# Patient Record
Sex: MALE | URBAN - NONMETROPOLITAN AREA
[De-identification: names, ages, dates, MRNs, and addresses within clinical notes are randomized per-mention and may not be internally consistent; named-entity substitution may affect disease eponyms.]

---

## 2021-06-24 LAB
ALBUMIN SERPL-MCNC: 4.3 G/DL (ref 3.4–5)
ALBUMIN/GLOB SERPL: 1.5 {RATIO}
ALP SERPL-CCNC: 78 U/L (ref 50–144)
ALT SERPL-CCNC: 22 U/L (ref 1–45)
ANION GAP SERPL CALC-SCNC: 8 MMOL/L (ref 7–16)
AST SERPL-CCNC: 23 U/L (ref 17–59)
BASOPHILS # BLD AUTO: 0.05 X10(3)/MCL (ref 0.01–0.08)
BASOPHILS NFR BLD AUTO: 0.7 % (ref 0.1–1.2)
BILIRUB SERPL-MCNC: 0.31 MG/DL (ref 0.1–1)
BUN SERPL-MCNC: 11 MG/DL (ref 7–20)
CALCIUM SERPL-MCNC: 9.7 MG/DL (ref 8.4–10.2)
CHLORIDE SERPL-SCNC: 103 MMOL/L (ref 94–110)
CHOLEST SERPL-MCNC: 178 MG/DL (ref 0–200)
CO2 SERPL-SCNC: 29 MMOL/L (ref 21–32)
CREAT SERPL-MCNC: 0.9 MG/DL (ref 0.66–1.25)
CREAT/UREA NIT SERPL: 12.2 (ref 12–20)
EOSINOPHIL # BLD AUTO: 0.35 X10(3)/MCL (ref 0.04–0.54)
EOSINOPHIL NFR BLD AUTO: 4.9 % (ref 0.7–7)
ERYTHROCYTE [DISTWIDTH] IN BLOOD BY AUTOMATED COUNT: 12.5 % (ref 11.6–14.4)
GLOBULIN SER-MCNC: 2.8 G/DL (ref 2–3.9)
GLUCOSE SERPL-MCNC: 100 MGM./DL (ref 70–115)
HCT VFR BLD AUTO: 44.1 % (ref 36–52)
HDLC SERPL-MCNC: 38 MG/DL (ref 40–60)
HGB BLD-MCNC: 15.1 G/DL (ref 13–18)
IMM GRANULOCYTES # BLD AUTO: 0.02 X10E3/UL (ref 0–0.03)
IMM GRANULOCYTES NFR BLD AUTO: 0.3 % (ref 0–0.5)
LDLC SERPL CALC-MCNC: 122.1 MG/DL (ref 30–100)
LYMPHOCYTES # BLD AUTO: 2.36 X10(3)/MCL (ref 1.32–3.57)
LYMPHOCYTES NFR BLD AUTO: 32.8 % (ref 20–55)
MCH RBC QN AUTO: 29.3 PG (ref 27–34)
MCHC RBC AUTO-ENTMCNC: 34.2 G/DL (ref 31–37)
MCV RBC AUTO: 85.5 FL (ref 79–99)
MONOCYTES # BLD AUTO: 0.85 X10(3)/MCL (ref 0.3–0.82)
MONOCYTES NFR BLD AUTO: 11.8 % (ref 4.7–12.5)
NEUTROPHILS # BLD AUTO: 3.56 X10(3)/MCL (ref 1.78–5.38)
NEUTROPHILS NFR BLD AUTO: 49.5 % (ref 37–73)
PLATELET # BLD AUTO: 288 X10(3)/MCL (ref 140–371)
PMV BLD AUTO: 10.9 FL (ref 9.4–12.4)
POTASSIUM SERPL-SCNC: 4.4 MMOL/L (ref 3.5–5.1)
PROT SERPL-MCNC: 7.1 G/DL (ref 6.3–8.2)
RBC # BLD AUTO: 5.16 X10(6)/MCL (ref 4–6)
SODIUM SERPL-SCNC: 140 MMOL/L (ref 135–145)
T4 FREE SERPL-MCNC: 1.03 NG/DL (ref 0.78–2.19)
TRIGL SERPL-MCNC: 97 MG/DL (ref 30–200)
TSH SERPL-ACNC: 6.27 UIU/ML (ref 0.36–3.74)
WBC # SPEC AUTO: 7.2 X10(3)/MCL (ref 4–11.5)

## 2022-02-24 ENCOUNTER — HISTORICAL (OUTPATIENT)
Dept: ADMINISTRATIVE | Facility: HOSPITAL | Age: 27
End: 2022-02-24

## 2022-04-11 ENCOUNTER — HISTORICAL (OUTPATIENT)
Dept: ADMINISTRATIVE | Facility: HOSPITAL | Age: 27
End: 2022-04-11

## 2022-04-29 VITALS
SYSTOLIC BLOOD PRESSURE: 118 MMHG | BODY MASS INDEX: 28.89 KG/M2 | WEIGHT: 184.06 LBS | HEIGHT: 67 IN | DIASTOLIC BLOOD PRESSURE: 78 MMHG | OXYGEN SATURATION: 97 %

## 2022-05-08 ENCOUNTER — HISTORICAL (OUTPATIENT)
Dept: ADMINISTRATIVE | Facility: HOSPITAL | Age: 27
End: 2022-05-08

## 2022-06-28 ENCOUNTER — HOSPITAL ENCOUNTER (EMERGENCY)
Facility: HOSPITAL | Age: 27
Discharge: PSYCHIATRIC OR SUBSTANCE USE FACILITY | End: 2022-06-29
Attending: STUDENT IN AN ORGANIZED HEALTH CARE EDUCATION/TRAINING PROGRAM
Payer: MEDICARE

## 2022-06-28 DIAGNOSIS — F32.A DEPRESSION, UNSPECIFIED DEPRESSION TYPE: Primary | ICD-10-CM

## 2022-06-28 DIAGNOSIS — R45.89 SUICIDAL RISK: ICD-10-CM

## 2022-06-28 DIAGNOSIS — R45.851 SUICIDAL IDEATIONS: ICD-10-CM

## 2022-06-28 DIAGNOSIS — E03.9 HYPOTHYROIDISM, UNSPECIFIED TYPE: ICD-10-CM

## 2022-06-28 LAB
ALBUMIN SERPL-MCNC: 4.3 GM/DL (ref 3.5–5)
ALBUMIN/GLOB SERPL: 1.2 RATIO (ref 1.1–2)
ALP SERPL-CCNC: 88 UNIT/L (ref 40–150)
ALT SERPL-CCNC: 17 UNIT/L (ref 0–55)
AMPHET UR QL SCN: NEGATIVE
APAP SERPL-MCNC: <17.4 UG/ML (ref 17.4–30)
APPEARANCE UR: CLEAR
AST SERPL-CCNC: 18 UNIT/L (ref 5–34)
BARBITURATE SCN PRESENT UR: NEGATIVE
BASOPHILS # BLD AUTO: 0.04 X10(3)/MCL (ref 0–0.2)
BASOPHILS NFR BLD AUTO: 0.5 %
BENZODIAZ UR QL SCN: NEGATIVE
BILIRUB UR QL STRIP.AUTO: NEGATIVE MG/DL
BILIRUBIN DIRECT+TOT PNL SERPL-MCNC: 0.4 MG/DL
BUN SERPL-MCNC: 12 MG/DL (ref 8.9–20.6)
CALCIUM SERPL-MCNC: 9.9 MG/DL (ref 8.4–10.2)
CANNABINOIDS UR QL SCN: NEGATIVE
CHLORIDE SERPL-SCNC: 104 MMOL/L (ref 98–107)
CO2 SERPL-SCNC: 25 MMOL/L (ref 22–29)
COCAINE UR QL SCN: NEGATIVE
COLOR UR AUTO: YELLOW
CREAT SERPL-MCNC: 0.97 MG/DL (ref 0.73–1.18)
EOSINOPHIL # BLD AUTO: 0.13 X10(3)/MCL (ref 0–0.9)
EOSINOPHIL NFR BLD AUTO: 1.5 %
ERYTHROCYTE [DISTWIDTH] IN BLOOD BY AUTOMATED COUNT: 12.4 % (ref 11.5–17)
ETHANOL SERPL-MCNC: <10 MG/DL
FENTANYL UR QL SCN: NEGATIVE
GLOBULIN SER-MCNC: 3.5 GM/DL (ref 2.4–3.5)
GLUCOSE SERPL-MCNC: 100 MG/DL (ref 74–100)
GLUCOSE UR QL STRIP.AUTO: NEGATIVE MG/DL
HCT VFR BLD AUTO: 44.9 % (ref 42–52)
HGB BLD-MCNC: 15 GM/DL (ref 14–18)
IMM GRANULOCYTES # BLD AUTO: 0.06 X10(3)/MCL (ref 0–0.02)
IMM GRANULOCYTES NFR BLD AUTO: 0.7 % (ref 0–0.43)
KETONES UR QL STRIP.AUTO: NEGATIVE MG/DL
LEUKOCYTE ESTERASE UR QL STRIP.AUTO: NEGATIVE UNIT/L
LYMPHOCYTES # BLD AUTO: 1.87 X10(3)/MCL (ref 0.6–4.6)
LYMPHOCYTES NFR BLD AUTO: 21.3 %
MCH RBC QN AUTO: 28.5 PG (ref 27–31)
MCHC RBC AUTO-ENTMCNC: 33.4 MG/DL (ref 33–36)
MCV RBC AUTO: 85.2 FL (ref 80–94)
MDMA UR QL SCN: NEGATIVE
MONOCYTES # BLD AUTO: 0.83 X10(3)/MCL (ref 0.1–1.3)
MONOCYTES NFR BLD AUTO: 9.4 %
NEUTROPHILS # BLD AUTO: 5.9 X10(3)/MCL (ref 2.1–9.2)
NEUTROPHILS NFR BLD AUTO: 66.6 %
NITRITE UR QL STRIP.AUTO: NEGATIVE
OPIATES UR QL SCN: NEGATIVE
PCP UR QL: NEGATIVE
PH UR STRIP.AUTO: 5.5 [PH]
PH UR: 5.5 [PH] (ref 3–11)
PLATELET # BLD AUTO: 287 X10(3)/MCL (ref 130–400)
PMV BLD AUTO: 10 FL (ref 9.4–12.4)
POTASSIUM SERPL-SCNC: 3.7 MMOL/L (ref 3.5–5.1)
PROT SERPL-MCNC: 7.8 GM/DL (ref 6.4–8.3)
PROT UR QL STRIP.AUTO: NEGATIVE MG/DL
RBC # BLD AUTO: 5.27 X10(6)/MCL (ref 4.7–6.1)
RBC UR QL AUTO: NEGATIVE UNIT/L
SALICYLATES SERPL-MCNC: <5 MG/DL
SARS-COV-2 RDRP RESP QL NAA+PROBE: NEGATIVE
SODIUM SERPL-SCNC: 140 MMOL/L (ref 136–145)
SP GR UR STRIP.AUTO: 1.02
SPECIFIC GRAVITY, URINE AUTO (.000) (OHS): 1.02 (ref 1–1.03)
TSH SERPL-ACNC: 6.47 UIU/ML (ref 0.35–4.94)
UROBILINOGEN UR STRIP-ACNC: 0.2 MG/DL
WBC # SPEC AUTO: 8.8 X10(3)/MCL (ref 4.5–11.5)

## 2022-06-28 PROCEDURE — 87635 SARS-COV-2 COVID-19 AMP PRB: CPT | Performed by: STUDENT IN AN ORGANIZED HEALTH CARE EDUCATION/TRAINING PROGRAM

## 2022-06-28 PROCEDURE — 80053 COMPREHEN METABOLIC PANEL: CPT | Performed by: STUDENT IN AN ORGANIZED HEALTH CARE EDUCATION/TRAINING PROGRAM

## 2022-06-28 PROCEDURE — 80179 DRUG ASSAY SALICYLATE: CPT | Performed by: STUDENT IN AN ORGANIZED HEALTH CARE EDUCATION/TRAINING PROGRAM

## 2022-06-28 PROCEDURE — 80143 DRUG ASSAY ACETAMINOPHEN: CPT | Performed by: STUDENT IN AN ORGANIZED HEALTH CARE EDUCATION/TRAINING PROGRAM

## 2022-06-28 PROCEDURE — 82077 ASSAY SPEC XCP UR&BREATH IA: CPT | Performed by: STUDENT IN AN ORGANIZED HEALTH CARE EDUCATION/TRAINING PROGRAM

## 2022-06-28 PROCEDURE — 36415 COLL VENOUS BLD VENIPUNCTURE: CPT | Performed by: STUDENT IN AN ORGANIZED HEALTH CARE EDUCATION/TRAINING PROGRAM

## 2022-06-28 PROCEDURE — 84443 ASSAY THYROID STIM HORMONE: CPT | Performed by: STUDENT IN AN ORGANIZED HEALTH CARE EDUCATION/TRAINING PROGRAM

## 2022-06-28 PROCEDURE — 85025 COMPLETE CBC W/AUTO DIFF WBC: CPT | Performed by: STUDENT IN AN ORGANIZED HEALTH CARE EDUCATION/TRAINING PROGRAM

## 2022-06-28 PROCEDURE — 80307 DRUG TEST PRSMV CHEM ANLYZR: CPT | Performed by: STUDENT IN AN ORGANIZED HEALTH CARE EDUCATION/TRAINING PROGRAM

## 2022-06-28 PROCEDURE — 99285 EMERGENCY DEPT VISIT HI MDM: CPT | Mod: 25

## 2022-06-28 PROCEDURE — 81003 URINALYSIS AUTO W/O SCOPE: CPT | Performed by: STUDENT IN AN ORGANIZED HEALTH CARE EDUCATION/TRAINING PROGRAM

## 2022-06-28 RX ORDER — LEVOTHYROXINE SODIUM 125 UG/1
125 TABLET ORAL
Qty: 30 TABLET | Refills: 11 | Status: ON HOLD | OUTPATIENT
Start: 2022-06-28 | End: 2022-06-29

## 2022-06-29 ENCOUNTER — HOSPITAL ENCOUNTER (INPATIENT)
Facility: HOSPITAL | Age: 27
LOS: 6 days | Discharge: HOME | DRG: 885 | End: 2022-07-05
Attending: PSYCHIATRY & NEUROLOGY | Admitting: PSYCHIATRY & NEUROLOGY
Payer: MEDICARE

## 2022-06-29 VITALS
DIASTOLIC BLOOD PRESSURE: 90 MMHG | RESPIRATION RATE: 18 BRPM | BODY MASS INDEX: 27 KG/M2 | SYSTOLIC BLOOD PRESSURE: 139 MMHG | OXYGEN SATURATION: 99 % | TEMPERATURE: 98 F | WEIGHT: 170 LBS | HEART RATE: 86 BPM

## 2022-06-29 DIAGNOSIS — F32.9 MDD (MAJOR DEPRESSIVE DISORDER): ICD-10-CM

## 2022-06-29 PROBLEM — F33.2 MAJOR DEPRESSIVE DISORDER, RECURRENT EPISODE, SEVERE: Status: ACTIVE | Noted: 2022-06-29

## 2022-06-29 PROBLEM — R15.9 ENCOPRESIS: Status: ACTIVE | Noted: 2022-06-29

## 2022-06-29 PROBLEM — E03.9 HYPOTHYROID: Status: ACTIVE | Noted: 2022-06-29

## 2022-06-29 PROCEDURE — 25000003 PHARM REV CODE 250: Performed by: PSYCHIATRY & NEUROLOGY

## 2022-06-29 PROCEDURE — 11400000 HC PSYCH PRIVATE ROOM

## 2022-06-29 RX ORDER — FLUOXETINE 10 MG/1
10 CAPSULE ORAL DAILY
Status: DISCONTINUED | OUTPATIENT
Start: 2022-06-29 | End: 2022-07-05 | Stop reason: HOSPADM

## 2022-06-29 RX ORDER — ACETAMINOPHEN 325 MG/1
650 TABLET ORAL EVERY 6 HOURS PRN
Status: DISCONTINUED | OUTPATIENT
Start: 2022-06-29 | End: 2022-07-05 | Stop reason: HOSPADM

## 2022-06-29 RX ORDER — ADHESIVE BANDAGE
30 BANDAGE TOPICAL DAILY PRN
Status: DISCONTINUED | OUTPATIENT
Start: 2022-06-29 | End: 2022-07-05 | Stop reason: HOSPADM

## 2022-06-29 RX ORDER — MAG HYDROX/ALUMINUM HYD/SIMETH 200-200-20
30 SUSPENSION, ORAL (FINAL DOSE FORM) ORAL EVERY 6 HOURS PRN
Status: DISCONTINUED | OUTPATIENT
Start: 2022-06-29 | End: 2022-07-05 | Stop reason: HOSPADM

## 2022-06-29 RX ORDER — IBUPROFEN 400 MG/1
400 TABLET ORAL EVERY 6 HOURS PRN
Status: DISCONTINUED | OUTPATIENT
Start: 2022-06-29 | End: 2022-07-05 | Stop reason: HOSPADM

## 2022-06-29 RX ADMIN — LEVOTHYROXINE SODIUM 125 MCG: 0.1 TABLET ORAL at 05:06

## 2022-06-29 RX ADMIN — FLUOXETINE 10 MG: 10 CAPSULE ORAL at 02:06

## 2022-06-29 NOTE — HOSPITAL COURSE
He was admitted to the [facility] and monitored for safety. He was provided with therapy. Meds were reconciled. He was restarted on synthroid. He was given prozac for depression. Mood improved. HE denied si/hi. He learned coping skills. He was worried about being home to pay bills. He has no aggression and no psychosis. He was stable for discharge.    Psychiatric Mental Status Exam:  General Appearance: well groomed  Arousal: alert  Behavior: cooperative  Movements and Motor Activity: no abnormal involuntary movements noted  Orientation: oriented to person, place, time, and situation  Speech: normal rate, normal rhythm, normal volume, normal tone  Mood: good  Affect: mood-congruent, constricted  Thought Process: linear, logical  Associations: intact  Thought Content and Perceptions: no suicidal ideation, no homicidal ideation, no auditory hallucinations, no visual hallucinations, no paranoid ideation, no ideas of reference, no evidence of delusions or psychosis  Recent and Remote Memory: recent memory intact, remote memory intact  Attention and Concentration: intact, attentive to conversation  Fund of Knowledge: intact, aware of current events, vocabulary appropriate  Insight: fair  Judgment: fair

## 2022-06-29 NOTE — NURSING
Awake and alert c/o voiced of rt face pain scale 7/10 Tylenol [id number] po given will cont to monitor

## 2022-06-29 NOTE — SUBJECTIVE & OBJECTIVE
Patient was seen via video telemedicine and consented to the interview. The patient was located in [place], [place] and the provider was located in [place], [place].    Psychiatric Mental Status Exam:  General Appearance: disheveled  Arousal: alert  Behavior: cooperative  Movements and Motor Activity: no abnormal involuntary movements noted  Orientation: oriented to person, place, time, and situation  Speech: normal rate, normal rhythm, normal volume, normal tone  Mood: Depressed  Affect: mood-congruent, constricted  Thought Process: linear, logical  Associations: intact  Thought Content and Perceptions: (+)suicidal ideation, no homicidal ideation, no auditory hallucinations, no visual hallucinations, no paranoid ideation, no ideas of reference, no evidence of delusions or psychosis  Recent and Remote Memory: recent memory intact, remote memory intact  Attention and Concentration: intact, attentive to conversation  Fund of Knowledge: intact, aware of current events, vocabulary appropriate  Insight: limited  Judgment: limited          Patient History               Medical as of 6/29/2022       Past Medical History       Diagnosis Date Comments Source    Depression -- -- Provider    Psychiatric problem -- -- Provider    Suicide attempt -- -- Provider    Thyroid disease -- -- Provider                          Surgical as of 6/29/2022    Past Surgical History: Patient provided no pertinent surgical history.               Family as of 6/29/2022    None               Tobacco Use as of 6/29/2022       Smoking Status Smoking Start Date Smoking Quit Date Packs/Day Years Used    Never Smoker -- -- -- --      Types Comments Smokeless Tobacco Status Smokeless Tobacco Quit Date Source    -- -- Never Used -- Provider                  Alcohol Use as of 6/29/2022       Alcohol Use Drinks/Week Alcohol/Week Comments Source    Never   -- -- Provider                  Drug Use as of 6/29/2022       Drug Use Types Frequency Comments Source     Never -- -- -- Provider                  Sexual Activity as of 6/29/2022    None               Activities of Daily Living as of 6/29/2022    None               Social Documentation as of 6/29/2022    None               Occupational as of 6/29/2022    None               Socioeconomic as of 6/29/2022       Marital Status Spouse Name Number of Children Years Education Education Level Preferred Language Ethnicity Race Source    Significant Other -- 0 -- -- -- -- -- Provider                  Pertinent History       Question Response Comments    Lives with significant other --    Place in Birth Order -- --    Lives in home --    Number of Siblings -- --    Raised by -- --    Legal Involvement -- --    Childhood Trauma -- --    Criminal History of none --    Financial Status unemployed --    Highest Level of Education unfinished college --    Does patient have access to a firearm? No --     Service No --    Primary Leisure Activity -- --    Spirituality -- --          Past Medical History:   Diagnosis Date    Depression     Psychiatric problem     Suicide attempt     Thyroid disease      No past surgical history on file.  Family History    None       Tobacco Use    Smoking status: Never Smoker    Smokeless tobacco: Never Used   Substance and Sexual Activity    Alcohol use: Never    Drug use: Never    Sexual activity: Not on file     Review of patient's allergies indicates:   Allergen Reactions    Fish containing products Anaphylaxis    Shellfish containing products Anaphylaxis       No current facility-administered medications on file prior to encounter.     Current Outpatient Medications on File Prior to Encounter   Medication Sig    levothyroxine (SYNTHROID) 125 MCG tablet Take 1 tablet (125 mcg total) by mouth before breakfast.     Psychotherapeutics (From admission, onward)                None          Review of Systems  Strengths and Liabilities: Strength: Patient is expressive/articulate., Liability: Patient  "is defensive., Liability: Patient has poor judgment    Objective:     Vital Signs (Most Recent):  Temp: 98.1 °F (36.7 °C) (06/29/22 0645)  Pulse: 73 (06/29/22 0645)  Resp: 18 (06/29/22 0645)  BP: (!) 145/70 (06/29/22 0645)  SpO2: 99 % (06/29/22 0645)   Vital Signs (24h Range):  Temp:  [97.5 °F (36.4 °C)-98.1 °F (36.7 °C)] 98.1 °F (36.7 °C)  Pulse:  [73-89] 73  Resp:  [16-18] 18  SpO2:  [98 %-99 %] 99 %  BP: (136-145)/(70-95) 145/70     Height: 5' 10" (177.8 cm)  Weight: 81.4 kg (179 lb 7.3 oz)  Body mass index is 25.75 kg/m².    No intake or output data in the 24 hours ending 06/29/22 1336    Physical Exam     Significant Labs: Last 72 Hours:   Recent Lab Results         06/28/22 2107 06/28/22  2100        Phencyclidine Negative         Albumin/Globulin Ratio   1.2       Acetaminophen (Tylenol), Serum   <17.4       Albumin   4.3       Alcohol, Serum   <10.0       Alkaline Phosphatase   88       ALT   17       Amphetamine Screen, Ur Negative         Appearance, UA Clear         AST   18       Barbiturate Screen, Ur Negative         Baso #   0.04       Basophil %   0.5       Benzodiazepine Screen, Urine Negative         BILIRUBIN TOTAL   0.4       Bilirubin, UA Negative         BUN   12.0       Calcium   9.9       Cannabinoids, Urine Negative         Chloride   104       CO2   25       Cocaine (Metab.) Negative         Color, UA Yellow         ID NOW COVID-19, (TANVI) Negative         Creatinine   0.97       eGFR if non    >60       Eos #   0.13       Eosinophil %   1.5       Fentanyl, Urine Negative         Globulin, Total   3.5       Glucose   100       Glucose, UA Negative         Hematocrit   44.9       Hemoglobin   15.0       Immature Grans (Abs)   0.06       Immature Granulocytes   0.7       Ketones, UA Negative         Leukocytes, UA Negative         Lymph #   1.87       LYMPH %   21.3       MCH   28.5       MCHC   33.4       MCV   85.2       MDMA, Urine Negative         Mono #   0.83       " Mono %   9.4       MPV   10.0       Neut #   5.9       Neut %   66.6       NITRITE UA Negative         Occult Blood UA Negative         Opiate Scrn, Ur Negative         pH, UA 5.5         pH, Urine 5.5         Platelets   287       Potassium   3.7       PROTEIN TOTAL   7.8       Protein, UA Negative         RBC   5.27       RDW   12.4       Salicylate Lvl   <5.0       Sodium   140       Specific Gravity,UA 1.025         Specific Gravity, Urine Auto 1.025         Thyroid Stimulating Hormone   6.4657       Urobilinogen, UA 0.2         WBC   8.8               Significant Imaging: I have reviewed all pertinent imaging results/findings within the past 24 hours.

## 2022-06-29 NOTE — PROGRESS NOTES
"[facility]  Psychiatry  Progress Note    Patient Name: Erasmo Man  MRN: [id number]   Code Status: Full Code  Admission Date: 6/29/2022  Hospital Length of Stay: 0 days  Expected Discharge Date: 7/6/2022  Attending Physician: Edmundo Jamison MD  Primary Care Provider: Ignacio Shetty NP    Current Legal Status: Physician's Emergency Certificate (PEC)    Patient information was obtained from patient and ER records.       Subjective:     Patient is a 26 y.o., male, presents with:    Principal Problem:<principal problem not specified>    Chief Complaint: A stupid mistake.     HPI: "A stupid mistake saying I was going to kill myself."  26-year-old male admitted for depression and SI with plan to kills self with a knife. He argued with his girlfriend and reportedly grabbed a knife to harm self and she called the police. Admits he did this but says he was not going to go through with harming self.He is upset because of financial strain and not working. 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 He  does have a history of depression but was never treated. He tried to harm self as teenager but did not go to the hospital. He has no psychotic features and no shadi. 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      Hospital Course: He was admitted to the [facility] hospital and monitored for safety. He was provided with therapy. Meds were reconciled. He was restarted on synthroid. He was given prozac for depression.       Patient was seen via video telemedicine and consented to the interview. The patient was located in [place], [place] and the provider was located in [place], [place].    Psychiatric Mental Status Exam:  General Appearance: disheveled  Arousal: alert  Behavior: cooperative  Movements and Motor Activity: no abnormal involuntary movements noted  Orientation: oriented to person, place, time, and situation  Speech: normal rate, normal " rhythm, normal volume, normal tone  Mood: Depressed  Affect: mood-congruent, constricted  Thought Process: linear, logical  Associations: intact  Thought Content and Perceptions: (+)suicidal ideation, no homicidal ideation, no auditory hallucinations, no visual hallucinations, no paranoid ideation, no ideas of reference, no evidence of delusions or psychosis  Recent and Remote Memory: recent memory intact, remote memory intact  Attention and Concentration: intact, attentive to conversation  Fund of Knowledge: intact, aware of current events, vocabulary appropriate  Insight: limited  Judgment: limited          Patient History               Medical as of 6/29/2022       Past Medical History       Diagnosis Date Comments Source    Depression -- -- Provider    Psychiatric problem -- -- Provider    Suicide attempt -- -- Provider    Thyroid disease -- -- Provider                          Surgical as of 6/29/2022    Past Surgical History: Patient provided no pertinent surgical history.               Family as of 6/29/2022    None               Tobacco Use as of 6/29/2022       Smoking Status Smoking Start Date Smoking Quit Date Packs/Day Years Used    Never Smoker -- -- -- --      Types Comments Smokeless Tobacco Status Smokeless Tobacco Quit Date Source    -- -- Never Used -- Provider                  Alcohol Use as of 6/29/2022       Alcohol Use Drinks/Week Alcohol/Week Comments Source    Never   -- -- Provider                  Drug Use as of 6/29/2022       Drug Use Types Frequency Comments Source    Never -- -- -- Provider                  Sexual Activity as of 6/29/2022    None               Activities of Daily Living as of 6/29/2022    None               Social Documentation as of 6/29/2022    None               Occupational as of 6/29/2022    None               Socioeconomic as of 6/29/2022       Marital Status Spouse Name Number of Children Years Education Education Level Preferred Language Ethnicity Race Source     Significant Other -- 0 -- -- -- -- -- Provider                  Pertinent History       Question Response Comments    Lives with significant other --    Place in Birth Order -- --    Lives in home --    Number of Siblings -- --    Raised by -- --    Legal Involvement -- --    Childhood Trauma -- --    Criminal History of none --    Financial Status unemployed --    Highest Level of Education unfinished college --    Does patient have access to a firearm? No --     Service No --    Primary Leisure Activity -- --    Spirituality -- --          Past Medical History:   Diagnosis Date    Depression     Psychiatric problem     Suicide attempt     Thyroid disease      No past surgical history on file.  Family History    None       Tobacco Use    Smoking status: Never Smoker    Smokeless tobacco: Never Used   Substance and Sexual Activity    Alcohol use: Never    Drug use: Never    Sexual activity: Not on file     Review of patient's allergies indicates:   Allergen Reactions    Fish containing products Anaphylaxis    Shellfish containing products Anaphylaxis       No current facility-administered medications on file prior to encounter.     Current Outpatient Medications on File Prior to Encounter   Medication Sig    levothyroxine (SYNTHROID) 125 MCG tablet Take 1 tablet (125 mcg total) by mouth before breakfast.     Psychotherapeutics (From admission, onward)                None          Review of Systems  Strengths and Liabilities: Strength: Patient is expressive/articulate., Liability: Patient is defensive., Liability: Patient has poor judgment    Objective:     Vital Signs (Most Recent):  Temp: 98.1 °F (36.7 °C) (06/29/22 0645)  Pulse: 73 (06/29/22 0645)  Resp: 18 (06/29/22 0645)  BP: (!) 145/70 (06/29/22 0645)  SpO2: 99 % (06/29/22 0645)   Vital Signs (24h Range):  Temp:  [97.5 °F (36.4 °C)-98.1 °F (36.7 °C)] 98.1 °F (36.7 °C)  Pulse:  [73-89] 73  Resp:  [16-18] 18  SpO2:  [98 %-99 %] 99 %  BP:  "(136-145)/(70-95) 145/70     Height: 5' 10" (177.8 cm)  Weight: 81.4 kg (179 lb 7.3 oz)  Body mass index is 25.75 kg/m².    No intake or output data in the 24 hours ending 06/29/22 1336    Physical Exam     Significant Labs: Last 72 Hours:   Recent Lab Results         06/28/22  2107   06/28/22  2100        Phencyclidine Negative         Albumin/Globulin Ratio   1.2       Acetaminophen (Tylenol), Serum   <17.4       Albumin   4.3       Alcohol, Serum   <10.0       Alkaline Phosphatase   88       ALT   17       Amphetamine Screen, Ur Negative         Appearance, UA Clear         AST   18       Barbiturate Screen, Ur Negative         Baso #   0.04       Basophil %   0.5       Benzodiazepine Screen, Urine Negative         BILIRUBIN TOTAL   0.4       Bilirubin, UA Negative         BUN   12.0       Calcium   9.9       Cannabinoids, Urine Negative         Chloride   104       CO2   25       Cocaine (Metab.) Negative         Color, UA Yellow         ID NOW COVID-19, (TANVI) Negative         Creatinine   0.97       eGFR if non    >60       Eos #   0.13       Eosinophil %   1.5       Fentanyl, Urine Negative         Globulin, Total   3.5       Glucose   100       Glucose, UA Negative         Hematocrit   44.9       Hemoglobin   15.0       Immature Grans (Abs)   0.06       Immature Granulocytes   0.7       Ketones, UA Negative         Leukocytes, UA Negative         Lymph #   1.87       LYMPH %   21.3       MCH   28.5       MCHC   33.4       MCV   85.2       MDMA, Urine Negative         Mono #   0.83       Mono %   9.4       MPV   10.0       Neut #   5.9       Neut %   66.6       NITRITE UA Negative         Occult Blood UA Negative         Opiate Scrn, Ur Negative         pH, UA 5.5         pH, Urine 5.5         Platelets   287       Potassium   3.7       PROTEIN TOTAL   7.8       Protein, UA Negative         RBC   5.27       RDW   12.4       Salicylate Lvl   <5.0       Sodium   140       Specific Gravity,UA " 1.025         Specific Gravity, Urine Auto 1.025         Thyroid Stimulating Hormone   6.4657       Urobilinogen, UA 0.2         WBC   8.8               Significant Imaging: I have reviewed all pertinent imaging results/findings within the past 24 hours.       Scheduled Medications:   FLUoxetine  10 mg Oral Daily    levothyroxine  125 mcg Oral Before breakfast       PRN Medications:  acetaminophen, aluminum-magnesium hydroxide-simethicone, ibuprofen, magnesium hydroxide 400 mg/5 ml    Review of patient's allergies indicates:   Allergen Reactions    Fish containing products Anaphylaxis    Shellfish containing products Anaphylaxis       Assessment/Plan:     Major depressive disorder, recurrent episode, severe  Start prozac for depression and reviewed side effets         Need for Continued Hospitalization:  Psychiatric illness continues to pose a potential threat to life or bodily function, of self or others, thereby requiring the need for continued inpatient psychiatric hospitalization., Protective inpatient psychiatric hospitalization required while a safe disposition plan is enacted. and Requires ongoing hospitalization for stabilization of medications.    Anticipated Disposition:  Home or Self Care    Total time:  15 with greater than 50% of this time spent in counseling and/or coordination of care.       Edmundo Jamison MD   Psychiatry  [facility]

## 2022-06-29 NOTE — ED PROVIDER NOTES
Encounter Date: 6/28/2022       History     Chief Complaint   Patient presents with    Psychiatric Evaluation     Pt states mentioned in anger killing himself tonight to wife. Currently denies SI/HI or hallucinations.      The history is provided by the patient, a significant other and the EMS personnel.   26-year-old male with no known past medical history presents emergency department for suicidal ideation.  Patient's girlfriend called the police after he grabbed a knife in stating he would kill a self.  Patient states he was upset and has a lot going on in life because of not having a job having a lot of bills to pay.  Patient's girlfriend states that he has been having issues with anger and depression.  Patient does state he is depressed.  Currently denies any suicidal or homicidal ideation.  He does admit that he did grab a knife with states that now looking back at he probably would not kill himself.  The girlfriend states that she is concerned that he will kill a self if he is sent home.  Review of patient's allergies indicates:  No Known Allergies  No past medical history on file.  No past surgical history on file.  No family history on file.     Review of Systems   Constitutional: Negative for fever.   Respiratory: Negative for shortness of breath.    Cardiovascular: Negative for chest pain.   Gastrointestinal: Negative for abdominal pain.   Psychiatric/Behavioral: Positive for suicidal ideas.   All other systems reviewed and are negative.      Physical Exam     Initial Vitals [06/28/22 2040]   BP Pulse Resp Temp SpO2   (!) 145/95 89 16 97.5 °F (36.4 °C) 99 %      MAP       --         Physical Exam    Nursing note and vitals reviewed.  Constitutional: He appears well-developed and well-nourished. No distress.   Cardiovascular: Normal rate and regular rhythm.   Pulmonary/Chest: Breath sounds normal. No respiratory distress.   Abdominal: Abdomen is soft. Bowel sounds are normal. There is no abdominal  tenderness.   Musculoskeletal:         General: No tenderness. Normal range of motion.     Neurological: He is alert.   Skin: Skin is warm. Capillary refill takes less than 2 seconds.   Psychiatric: His speech is normal and behavior is normal. His mood appears anxious. He exhibits a depressed mood. He expresses no suicidal (although suicidal threats) ideation. He expresses no suicidal plans.         ED Course   Procedures  Labs Reviewed   TSH - Abnormal; Notable for the following components:       Result Value    Thyroid Stimulating Hormone 6.4657 (*)     All other components within normal limits   ACETAMINOPHEN LEVEL - Abnormal; Notable for the following components:    Acetaminophen Level <17.4 (*)     All other components within normal limits   CBC WITH DIFFERENTIAL - Abnormal; Notable for the following components:    IG# 0.06 (*)     IG% 0.7 (*)     All other components within normal limits   ALCOHOL,MEDICAL (ETHANOL) - Normal   SARS-COV-2 RNA AMPLIFICATION, QUAL - Normal   CBC W/ AUTO DIFFERENTIAL    Narrative:     The following orders were created for panel order CBC auto differential.  Procedure                               Abnormality         Status                     ---------                               -----------         ------                     CBC with Differential[[id number]]        Abnormal            Final result                 Please view results for these tests on the individual orders.   COMPREHENSIVE METABOLIC PANEL   URINALYSIS, REFLEX TO URINE CULTURE   SALICYLATE LEVEL   DRUG SCREEN, URINE (BEAKER)          Imaging Results    None          Medications - No data to display  Medical Decision Making:   Differential Diagnosis:   Suicidal risk, suicidal ideation, depression    Additional MDM:   Psych: A Physician Emergency Certificate (PEC) was done in the ED for: Suicidal. The patient has been medically cleared. Outcome: The patient was approved for transfer to another facility.                Medically cleared for psychiatry placement: 6/28/2022 10:16 PM    Clinical Impression:   Final diagnoses:  [F32.A] Depression, unspecified depression type (Primary)  [R45.851] Suicidal ideations  [E03.9] Hypothyroidism, unspecified type  [R45.89] Suicidal risk          ED Disposition Condition    Transfer to Psych Facility         ED Prescriptions     Medication Sig Dispense Start Date End Date Auth. Provider    levothyroxine (SYNTHROID) 125 MCG tablet Take 1 tablet (125 mcg total) by mouth before breakfast. 30 tablet 6/28/2022 6/28/2023 Fox Estrella MD        Follow-up Information    None          Fox Estrella MD  06/28/22 5542

## 2022-06-29 NOTE — H&P
"[facility]  Psychiatry  History & Physical    Patient Name: Erasmo Man  MRN: [id number]   Code Status: Full Code  Admission Date: 6/29/2022  Attending Physician: Edmundo Jamison MD   Primary Care Provider: Ignacio Shetty NP    Current Legal Status: Physician's Emergency Certificate (PEC)    Patient information was obtained from patient and ER records.     Subjective:     Principal Problem:<principal problem not specified>    Chief Complaint:  A stupid mistake     HPI: "A stupid mistake saying I was going to kill myself."  26-year-old male admitted for depression and SI with plan to kills self with a knife. He argued with his girlfriend and reportedly grabbed a knife to harm self and she called the police. Admits he did this but says he was not going to go through with harming self.He is upset because of financial strain and not working. 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 He  does have a history of depression but was never treated. He tried to harm self as teenager but did not go to the hospital. He has no psychotic features and no shadi. 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      No new subjective & objective note has been filed under this hospital service since the last note was generated.    Assessment/Plan:     Major depressive disorder, recurrent episode, severe  Start prozac for depression and reviewed side effets       Estimated Discharge Date: 7/6/2022  Initial Discharge Plan: Home    Prognosis: Fair    Need for Continued Hospitalization:   Psychiatric illness continues to pose a potential threat to life or bodily function, of self or others, thereby requiring the need for continued inpatient psychiatric hospitalization., Protective inpatient psychiatric hospitalization required while a safe disposition plan is enacted. and Requires ongoing hospitalization for stabilization of medications.    Total Time: " 30 with greater than 50% of time spent in counseling and/or coordination of care.     Edmundo Jamison MD   Psychiatry  [facility]

## 2022-06-29 NOTE — HPI
""A stupid mistake saying I was going to kill myself."  26-year-old male admitted for depression and SI with plan to kills self with a knife. He argued with his girlfriend and reportedly grabbed a knife to harm self and she called the police. Admits he did this but says he was not going to go through with harming self.He is upset because of financial strain and not working. 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 He  does have a history of depression but was never treated. He tried to harm self as teenager but did not go to the hospital. He has no psychotic features and no shadi. 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  "

## 2022-06-29 NOTE — NURSING
"26 year old C/M admitted per BETTIE finn from [facility] ER per [facility] to the services of Dr. Jamison. DX: Major Depression with S/I. ALLERGY: [employer]. Hospitalist notiifed of need for H/P and medical consult. Dx: of hyperthyroidism . TSH 6.4657.  Dr. Finnegan notified of need for CEC.  AAOX3. Speech is rapid and pressured. Anxious. Depressed with labile affect. Pt. admits that he has been having anger issue due to impending financial issues. "I need to go back to work soon. I have lots of bills." Denies S/I. "I just said  I wanted to use a knife to kill myself because I was fighting with my girlfriend."  His girlfriend is afraid he really will hurt himself so the Dr. CHAPPELL'D him. Suicide PX: initiated, and verbal no self harm contract obtained. Unkempt and malodors, with poor dental hygiene.  Pt. was hospitalized as a teenager in [place] for a long time. States I was almost committed for 6 months. Does not remember his DX:, or what medications he was on. Pt. Is cognitively impaired. States he has a Special Education degree. Pt. also has an issue with incontinence and wears a disposable brief, but robert not know the reason. Sharps contraband check done per MHT. Oriented to unit. Q 15 min. Safety checks initiated.   "

## 2022-06-29 NOTE — CONSULTS
History and Physical    Attending Physician: Dr. Villa      Date of Admit: 6/29/2022    Chief Complaint and Duration    No chief complaint on file.   for    Subjective:    History of Present Illness:    Erasmo Man is a 26 y.o.male who  has no past medical history on file.. The patient presented to ER on 6/29/2022 with a primary complaint of No chief complaint on file.    26-year-old male history of hyperthyroidism apparently on levothyroxine per chart review , presents with suicide ideation.  Patient states he is not suicidal or homicidal at present.  Denies hallucinations.  He states he has told his wife yesterday that he want to kill himself with a knife.  Under lot of stress secondary to financial issues.  Please see psychiatric notes for full details.    Denies any acute symptoms at present.  Denies chest pain, shortness present abdominal pain, blood in urine or stool.  No recent fevers cough or congestion.  No recent fevers no recent surgical history.  No allergies.  Denies tobacco use/alcohol use.  Denies taking any medications including Synthroid however per chart review he is on 01/20 5 mcg a day.    Past Medical History:    No past medical history on file.    Past Surgical History:    No past surgical history on file.    Allergies:    Review of patient's allergies indicates:   Allergen Reactions    Fish containing products Anaphylaxis    Shellfish containing products Anaphylaxis       Home Medications:    Prior to Admission medications    Medication Sig Start Date End Date Taking? Authorizing Provider   levothyroxine (SYNTHROID) 125 MCG tablet Take 1 tablet (125 mcg total) by mouth before breakfast. 6/28/22 6/28/23  Fox Estrella MD       Family History:    No family history on file.    Social History:             Immunizations:C  Currently on File with [facility]:   There is no immunization history on file for this patient.    Objective:    Vitals:    Vitals:    06/29/22 0500 06/29/22 0645  "  BP:  (!) 145/70   Pulse:  73   Resp:  18   Temp: 98.1 °F (36.7 °C) 98.1 °F (36.7 °C)   TempSrc:  Oral   SpO2:  99%   Weight: 81.4 kg (179 lb 7.3 oz)    Height: 5' 10" (1.778 m)        IOs:    No intake or output data in the 24 hours ending 06/29/22 0832    No intake/output data recorded.    Physical Examination:    GEN: alert and oriented to person, place, and time, No obvious distress,   conversant  Head: normal cephalic, atraumatic, no lacerations, bruising, hematoma  Eyes: PERRL, extra ocular movements intact,  conjunctivae pink/moist, no sclera icterus  Ears: TM normal, no bulging or drainage.  No mastoid tenderness. No erythema  Neck:Supple. No bruits. No jugular venous distension. No lymphadenopathy.   CV: regular rate rhythm, no murmurs, rubs, gallops,     RESP: lungs clear to auscultation, no wheezes or crackles, rales. Normal percussion, no dullness. trachea midline     ABD: soft, nontender, non distended, positive bowel sounds, no organomegaly, no scars    EXT: Pulses +2 upper and lower extremity, no edema, no cyanosis, capillary refil < 3 seconds     NEURO: Cranial nerves 2-12 grossly intact. Motor 5/5 strength. Sensation to light touch wnl. Reflexes + 2 upper extremities and lower extremities     SKIN:No rashes or lesions. No ulcer. Normal temperature     PSYCH: Normal affect. Normal mood. Approriate       Review of Systems:    Constitutional - no lethargy, night sweats, or weight loss   Eyes - no blurry vision, redness, or pain   Ears, Nose, Mouth, Throat - no ear pain, nasal congestion, runny nose, or dysphagia  Cardiovascular - no palpitations, or syncope, no chest pain  Respiratory - no trouble breathing, or wheezing   Gastrointestinal - no abdominal pain, no nausea, no vomiting   Genitourinary - no trouble urinating, no dysuria   Musculoskeletal - no joint swelling, redness, no joint pain  Integumentary (skin and/or breast)  Neurological - no confusion, memory loss, no arm and leg " weakness  Psychiatric - no depression, no suicidial ideation   Endocrine - no sweating, tremors,   Hematologic/Lymphatic - no lymph node swelling        Laboratory:    Laboratory Data Reviewed: Yes    Microbiology Data Reviewed: Yes    Imaging personally reviewed    Tests ordered    Chart reviewed    Recent Labs   Lab 06/28/22  2100   WBC 8.8   HGB 15.0         K 3.7   CO2 25   BUN 12.0   CALCIUM 9.9   ALBUMIN 4.3   BILITOT 0.4   AST 18   ALT 17       Microbiology Data:        EKG (my interpretation):    Radiology:    No results found.    ?    Assessment:    Erasmo Man is a 26 y.o. male with:    Present on Admission:  **None**    #Depression  #Hypothyroidism      Plan:    - Continue synthyroid, TSH high likely from non compliance, repeat TSH 4-8 weeks as outpatient while on Synthyroid 125 mcg/daily   - Monitor BP if remains >160/100 while hospitalized, call MD   - Defer depression Tx to psychiatry  - Follow up as needed        Brodie Villa

## 2022-06-29 NOTE — PSYCH EVALUATION
"Behavioral Health Unit  Psychosocial History and Assessment  Progress Note      Patient Name: Erasmo Man YOB: 1995 [place] [place] Date: 6/29/2022    Chief Complaint: depression and suicidal ideation    Consent:     Did the patient consent for an interview with the ? Yes    Did the patient consent for the  to contact family/friend/caregiver?   Yes  Name: Anna South-Girlfriend    Did the patient give consent for the  to inform family/friend/caregiver of his/her whereabouts or to discuss discharge planning? Yes    Source of Information: Face to face with patient    Is information obtained from interviews considered reliable?   yes    Reason for Admission:     There are no hospital problems to display for this patient.      History of Present Illness - (Patient Perception):   26 year old C/M admitted per BETTIE finn from [facility] ER per [facility] to the services of Dr. Jamison. DX: Major Depression with S/I. ALLERGY: [employer]. Hospitalist notiifed of need for H/P and medical consult. Dx: of hyperthyroidism . TSH 6.4657.  Dr. Finngean notified of need for CEC.  AAOX3. Speech is rapid and pressured. Anxious. Depressed with labile affect. Pt. admits that he has been having anger issue due to impending financial issues. "I need to go back to work soon. I have lots of bills." Denies S/I. "I just said  I wanted to use a knife to kill myself because I was fighting with my girlfriend."  His girlfriend is afraid he really will hurt himself so the Dr. CHAPPELL'D him. Suicide PX: initiated, and verbal no self harm contract obtained. Unkempt and malodors, with poor dental hygiene.  Pt. was hospitalized as a teenager in [place] for a long time. States I was almost committed for 6 months. Does not remember his DX:, or what medications he was on. Pt. Is cognitively impaired. States he has a Special Education degree. Pt. also has an issue with incontinence and wears a " disposable brief, but robert not know the reason.  Pt denies drug or alcohol use.    History of Present Illness - (Perception of Others): lifetime according to patient    Present biopsychosocial functioning: moderate    Past biopsychosocial functioning: Pt reports functioning has imporoved over the last several years.    Family and Marital/Relationship History:     Significant Other/Partner Relationships:  Partnered: Relationship intact    Family Relationships: Intact      Childhood History:     [place]    Who raised the patient? mother      How does patient describe their childhood? ok      Who is patient's primary support person? Anna South      Culture and Yazidi:     Yazidi: No Yazidi    How strong of a role does Presybeterian and spirituality play in patient's life? none    Anabaptist or spiritual concerns regarding treatment: not applicable     History of Abuse:   History of Abuse: Denies      Outcome:     Psychiatric and Medical History:     History of psychiatric illness or treatment: prior inpatient treatment and psychotropic management by PCP    Medical history: No past medical history on file.    Substance Abuse History:     Alcohol - (Patient Perspective):   Social History     Substance and Sexual Activity   Alcohol Use Not on file       Alcohol - (Collateral Perspective): none according to patient    Drugs - (Patient Perspective):   Social History     Substance and Sexual Activity   Drug Use Not on file       Drugs - (Collateral Perspective): none according to patient    Additional Comments:     Education:     Currently Enrolled? No  High School (9-12) or GED    Special Education? Yes    Interested in Completing Education/GED: No    Employment and Financial:     Currently employed? Pt is disabled    Source of Income: SSD    Able to afford basic needs (food, shelter, utilities)? Yes    Who manages finances/personal affairs? patient      Service:     ?  no    Combat Service? No     Community Resources:     Describe present use of community resources: none     Identify previously used community resources   (Include previous mental health treatment - outpatient and inpatient): Pt sees NP Ignacio Shetty in [place]    Environmental:     Current living situation:Lives with friend    Social Evaluation:     Patient Assets: Supportive family/friends, Motivation for treatment/growth and Work skills    Patient Limitations: Cognitive delays    High risk psychosocial issues that may impact discharge planning:   Cognitive issues.  Pt does not think he needs psychiatric intervention    Recommendations:     Anticipated discharge plan:   home    High risk issues requiring early treatment planning and immediate intervention: cognitive delays, life has not changed    Community resources needed for discharge planning:  aftercare treatment sources    Anticipated social work role(s) in treatment and discharge planning:    will provide advice and  and offer group therapy 4x per week.   will arrange for folowup care.

## 2022-06-29 NOTE — GROUP NOTE
Group Psychotherapy       Group Focus: Psychodynamic Group Psychotherapy      Number of patients in attendance: 6    Group Start Time: 0900  Group End Time:  0945  Groups Date: 6/29/2022  Group Topic:  Behavioral Health  Group Department: [facility] - [facility]al [facility]  Group Facilitators:  Alexis Helm LCSW  _____________________________________________________________________    Patient Name: Erasmo Man  MRN: [id number]  Patient Class: IP- Psych   Admission Date\Time: 6/29/2022  2:55 AM  Hospital Length of Stay: 0  Primary Care Provider: Ignacio Shetty NP     Referred by: Behavioral Medicine Unit Treatment Team     Target symptoms: Depression     Patient's response to treatment: Not Participating     Progress toward goals: Minimal progress     Interval History: Pt did not particpate in the group     Diagnosis: MDD     Plan: Continue treatment on BMU

## 2022-06-30 LAB
CHOLEST SERPL-MCNC: 220 MG/DL
CHOLEST/HDLC SERPL: 5 {RATIO} (ref 0–5)
GLUCOSE P FAST SERPL-MCNC: 93 MG/DL (ref 74–100)
HDLC SERPL-MCNC: 43 MG/DL (ref 35–60)
LDLC SERPL CALC-MCNC: 158 MG/DL (ref 50–140)
T PALLIDUM AB SER QL: NONREACTIVE
TRIGL SERPL-MCNC: 97 MG/DL (ref 34–140)
VLDLC SERPL CALC-MCNC: 19 MG/DL

## 2022-06-30 PROCEDURE — 82947 ASSAY GLUCOSE BLOOD QUANT: CPT | Performed by: PSYCHIATRY & NEUROLOGY

## 2022-06-30 PROCEDURE — 36415 COLL VENOUS BLD VENIPUNCTURE: CPT | Performed by: PSYCHIATRY & NEUROLOGY

## 2022-06-30 PROCEDURE — 86780 TREPONEMA PALLIDUM: CPT | Performed by: PSYCHIATRY & NEUROLOGY

## 2022-06-30 PROCEDURE — 80061 LIPID PANEL: CPT | Performed by: PSYCHIATRY & NEUROLOGY

## 2022-06-30 PROCEDURE — 25000003 PHARM REV CODE 250: Performed by: PSYCHIATRY & NEUROLOGY

## 2022-06-30 PROCEDURE — 11400000 HC PSYCH PRIVATE ROOM

## 2022-06-30 RX ADMIN — LEVOTHYROXINE SODIUM 125 MCG: 0.1 TABLET ORAL at 06:06

## 2022-06-30 RX ADMIN — FLUOXETINE 10 MG: 10 CAPSULE ORAL at 10:06

## 2022-06-30 NOTE — NURSING
Patient is resting quietly in his room, appears asleep.  Patient has blunted affect and depressed mood.  Patient has low energy and motivation.  Patient is withdrawn, guarded and isolates to room.  No interaction with staff or peers.  No acute distress noted.  Will continue to monitor closely and provide emotional support.

## 2022-06-30 NOTE — GROUP NOTE
Group Psychotherapy       Group Focus: Stress Management      Number of patients in attendance 9    Group Start Time: 1915  Group End Time:  2000  Groups Date: 6/29/2022  Group Topic:  Behavioral Health  Group Department: [facility]  Group Facilitators:  Alejandra Qiu RN  _____________________________________________________________________    Patient Name: Erasmo Man  MRN: [id number]  Patient Class: IP- Psych   Admission Date\Time: 6/29/2022  2:55 AM  Hospital Length of Stay: 0  Primary Care Provider: Ignacio Shetty NP     Referred by: Behavioral Medicine Unit Treatment Team     Target symptoms: Mood Disorder     Patient's response to treatment: Active Listening     Progress toward goals: Progressing well     Interval History: Patient did not attend group      Diagnosis:Major Depressive Disorder     Plan: Continue treatment on BMU

## 2022-06-30 NOTE — PROGRESS NOTES
"6/30/2022 8:05 AM   Erasmo Man   1995   [id number]        Psychiatry Progress Note     SUBJECTIVE:   Erasmo Man is a 26 y.o. male admitted for suicidal ideations. Staff reports that he does not attend groups and tends to isolate in his room. "I'm not a people person-I stay to myself". Today during the interview he reports that he has not been on depression medication since high school. He reports that he does not feel that he actually needs any medication. We also encouraged him to shower and tend to his ADL's. He has not bathed since being here. Focused on paying his bills and getting home. He did agree to "try" to go to groups. He denies any suicidal or homicidal ideations.        Current Medications:   Scheduled Meds:    FLUoxetine  10 mg Oral Daily    levothyroxine  125 mcg Oral Before breakfast      PRN Meds: acetaminophen, aluminum-magnesium hydroxide-simethicone, ibuprofen, magnesium hydroxide 400 mg/5 ml   Psychotherapeutics (From admission, onward)            Start     Stop Route Frequency Ordered    06/29/22 1345  FLUoxetine capsule 10 mg         -- Oral Daily 06/29/22 1342          Allergies:   Review of patient's allergies indicates:   Allergen Reactions    Fish containing products Anaphylaxis    Shellfish containing products Anaphylaxis        OBJECTIVE:   Vitals   Vitals:    06/30/22 0600   BP: 112/66   Pulse: 68   Resp: 18   Temp: 98.2 °F (36.8 °C)        Labs/Imaging/Studies:   Recent Results (from the past 36 hour(s))   Comprehensive metabolic panel    Collection Time: 06/28/22  9:00 PM   Result Value Ref Range    Sodium Level 140 136 - 145 mmol/L    Potassium Level 3.7 3.5 - 5.1 mmol/L    Chloride 104 98 - 107 mmol/L    Carbon Dioxide 25 22 - 29 mmol/L    Glucose Level 100 74 - 100 mg/dL    Blood Urea Nitrogen 12.0 8.9 - 20.6 mg/dL    Creatinine 0.97 0.73 - 1.18 mg/dL    Calcium Level Total 9.9 8.4 - 10.2 mg/dL    Protein Total 7.8 6.4 - 8.3 gm/dL    Albumin Level 4.3 3.5 - 5.0 gm/dL "    Globulin 3.5 2.4 - 3.5 gm/dL    Albumin/Globulin Ratio 1.2 1.1 - 2.0 ratio    Bilirubin Total 0.4 <=1.5 mg/dL    Alkaline Phosphatase 88 40 - 150 unit/L    Alanine Aminotransferase 17 0 - 55 unit/L    Aspartate Aminotransferase 18 5 - 34 unit/L    Estimated GFR-Non  >60 mls/min/1.73/m2   TSH    Collection Time: 06/28/22  9:00 PM   Result Value Ref Range    Thyroid Stimulating Hormone 6.4657 (H) 0.3500 - 4.9400 uIU/mL   Ethanol    Collection Time: 06/28/22  9:00 PM   Result Value Ref Range    Ethanol Level <10.0 <=10.0 mg/dL   Acetaminophen level    Collection Time: 06/28/22  9:00 PM   Result Value Ref Range    Acetaminophen Level <17.4 (L) 17.4 - 30.0 ug/ml   Salicylate level    Collection Time: 06/28/22  9:00 PM   Result Value Ref Range    Salicylate Level <5.0 mg/dL   CBC with Differential    Collection Time: 06/28/22  9:00 PM   Result Value Ref Range    WBC 8.8 4.5 - 11.5 x10(3)/mcL    RBC 5.27 4.70 - 6.10 x10(6)/mcL    Hgb 15.0 14.0 - 18.0 gm/dL    Hct 44.9 42.0 - 52.0 %    MCV 85.2 80.0 - 94.0 fL    MCH 28.5 27.0 - 31.0 pg    MCHC 33.4 33.0 - 36.0 mg/dL    RDW 12.4 11.5 - 17.0 %    Platelet 287 130 - 400 x10(3)/mcL    MPV 10.0 9.4 - 12.4 fL    Neut % 66.6 %    Lymph % 21.3 %    Mono % 9.4 %    Eos % 1.5 %    Basophil % 0.5 %    Lymph # 1.87 0.6 - 4.6 x10(3)/mcL    Neut # 5.9 2.1 - 9.2 x10(3)/mcL    Mono # 0.83 0.1 - 1.3 x10(3)/mcL    Eos # 0.13 0 - 0.9 x10(3)/mcL    Baso # 0.04 0 - 0.2 x10(3)/mcL    IG# 0.06 (H) 0 - 0.0155 x10(3)/mcL    IG% 0.7 (H) 0 - 0.43 %   Urinalysis, Reflex to Urine Culture Urine, Clean Catch    Collection Time: 06/28/22  9:07 PM    Specimen: Urine   Result Value Ref Range    Color, UA Yellow Yellow, Colorless, Other, Clear    Appearance, UA Clear Clear    Specific Gravity, UA 1.025     pH, UA 5.5 5.0, 5.5, 6.0, 6.5, 7.0, 7.5, 8.0, 8.5    Protein, UA Negative Negative, 300  mg/dL    Glucose, UA Negative Negative, Normal mg/dL    Ketones, UA Negative Negative, +1, +2,  +3, +4, +5, >=160, >=80 mg/dL    Blood, UA Negative Negative unit/L    Bilirubin, UA Negative Negative mg/dL    Urobilinogen, UA 0.2 0.2, 1.0, Normal mg/dL    Nitrites, UA Negative Negative    Leukocyte Esterase, UA Negative Negative, 75  unit/L   Drug Screen panel, emergency    Collection Time: 06/28/22  9:07 PM   Result Value Ref Range    Amphetamines, Urine Negative Negative    Barbituates, Urine Negative Negative    Benzodiazepine, Urine Negative Negative    Cannabinoids, Urine Negative Negative    Cocaine, Urine Negative Negative    Fentanyl, Urine Negative Negative    MDMA, Urine Negative Negative    Opiates, Urine Negative Negative    Phencyclidine, Urine Negative Negative    pH, Urine 5.5 3.0 - 11.0    Specific Gravity, Urine Auto 1.025 1.001 - 1.035   COVID-19 Rapid Screening    Collection Time: 06/28/22  9:07 PM   Result Value Ref Range    SARS COV-2 MOLECULAR Negative Negative   Glucose, Fasting    Collection Time: 06/30/22  5:49 AM   Result Value Ref Range    Glucose Fasting 93 74 - 100 mg/dL   Lipid panel    Collection Time: 06/30/22  5:49 AM   Result Value Ref Range    Cholesterol Total 220 (H) <=200 mg/dL    HDL Cholesterol 43 35 - 60 mg/dL    Triglyceride 97 34 - 140 mg/dL    Cholesterol/HDL Ratio 5 0 - 5    Very Low Density Lipoprotein 19     LDL Cholesterol 158.00 (H) 50.00 - 140.00 mg/dL        Psychiatric Mental Status Exam:  General Appearance: malodorous  Arousal: alert  Behavior: poor eye contact, reluctant to participate  Movements and Motor Activity: no tics, no tremors, no akathisia, no dystonia, no evidence of tardive dyskinesia  Orientation: oriented to person, place, and time  Speech: normal rate, normal rhythm, normal volume, normal tone  Mood: Dysphoric  Affect: mood-congruent  Thought Process: linear  Associations: intact  Thought Content and Perceptions: no suicidal ideation, no homicidal ideation, no auditory hallucinations, no visual hallucinations, no paranoid ideation, no ideas  of reference, no evidence of delusions or psychosis  Recent and Remote Memory: intact  Attention and Concentration: intact  Fund of Knowledge: aware of current events  Insight: limited  Judgment: limited    ASSESSMENT/PLAN:   Diagnosis:  Major Depressive Disorder recurrent severe        Past Medical History:   Diagnosis Date    Depression     Psychiatric problem     Suicide attempt     Thyroid disease         Plan:  Continue medications as ordered  Encourage active participation in treatment    Expected Disposition Plan: Home        Carmelo MCCALL PMHNP  Psychiatry  [facility]

## 2022-07-01 PROCEDURE — 11400000 HC PSYCH PRIVATE ROOM

## 2022-07-01 PROCEDURE — 25000003 PHARM REV CODE 250: Performed by: PSYCHIATRY & NEUROLOGY

## 2022-07-01 RX ADMIN — FLUOXETINE 10 MG: 10 CAPSULE ORAL at 08:07

## 2022-07-01 RX ADMIN — LEVOTHYROXINE SODIUM 125 MCG: 0.1 TABLET ORAL at 05:07

## 2022-07-01 NOTE — NURSING
Patient is awake and alert on the unit. Patient has constricted affect and labile mood.  Patient is focused on being discharged.  Patient is superficial and minimizes his behavior.  Patient stated his words were taken out of context and minimizes his suicidal ideation with a plan. Decrease in malodorous smell. No acute distress noted.  Will continue to monitor closely and provide emotional support.

## 2022-07-01 NOTE — NURSING
Awake, alert and oriented. Preoccupied with discharge. Denies SI or hallucinations. Pt states the medications are working well. Sleeping and eating well. Compliant with meds no adverse effects observed. Will monitor mood and behavior, encourage group participation and offer emotional support.

## 2022-07-01 NOTE — GROUP NOTE
Group Psychotherapy       Group Focus: Psychodynamic Group Psychotherapy centered around discussion of trust and lasting damage from trauma.     Number of patients in attendance: 6    Group Start Time: 0900  Group End Time:  0945  Groups Date: 7/1/2022  Group Topic:  Behavioral Health  Group Department: [facility]  Group Facilitators:  Alexis Helm LCSW  _____________________________________________________________________    Patient Name: Erasmo Man  MRN: [id number]  Patient Class: IP- Psych   Admission Date\Time: 6/29/2022  2:55 AM  Hospital Length of Stay: 2  Primary Care Provider: Ignacio Shetty NP     Referred by: Behavioral Medicine Unit Treatment Team     Target symptoms: Depression     Patient's response to treatment: Active Listening and Self-disclosure     Progress toward goals: Progressing adequately     Interval History: Pt able to state that he does not trust anyone really.     Diagnosis: MDD     Plan: Continue treatment on BMU

## 2022-07-01 NOTE — PROGRESS NOTES
"[facility]  Psychiatry  Progress Note    Patient Name: Erasmo Man  MRN: [id number]   Code Status: Full Code  Admission Date: 6/29/2022  Hospital Length of Stay: 2 days  Expected Discharge Date: 7/5/2022  Attending Physician: Edmundo Jamison MD  Primary Care Provider: Ignacio Shetty NP    Current Legal Status: Physician's Emergency Certificate (PEC)    Patient information was obtained from patient.       Subjective:     Patient is a 26 y.o., male, presents with:    Principal Problem:<principal problem not specified>    Chief Complaint: nothing much    HPI: "A stupid mistake saying I was going to kill myself."  26-year-old male admitted for depression and SI with plan to kills self with a knife. He argued with his girlfriend and reportedly grabbed a knife to harm self and she called the police. Admits he did this but says he was not going to go through with harming self.He is upset because of financial strain and not working. 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 He  does have a history of depression but was never treated. He tried to harm self as teenager but did not go to the hospital. He has no psychotic features and no shadi. 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      Hospital Course: He was admitted to the [facility] hospital and monitored for safety. He was provided with therapy. Meds were reconciled. He was restarted on synthroid. He was given prozac for depression.       Interval History: Patient was seen via video telemedicine and consented to the interview. The patient was located in [place], [place] and the provider was located in [place], [place].    Mood improving. No si/hi. No psychosis. Sleeping well. No side effects with prozac. Coping better. Fixated on going home to paying bills. He is now attending a few groups.     Psychiatric Mental Status Exam:  General Appearance: well groomed  Arousal: alert  Behavior: " "cooperative  Movements and Motor Activity: no abnormal involuntary movements noted  Orientation: oriented to person, place, time, and situation  Speech: normal rate, normal rhythm, normal volume, normal tone  Mood: improved  Affect: mood-congruent, constricted  Thought Process: linear, logical  Associations: intact  Thought Content and Perceptions: no suicidal ideation, no homicidal ideation, no auditory hallucinations, no visual hallucinations, no paranoid ideation, no ideas of reference, no evidence of delusions or psychosis  Recent and Remote Memory: recent memory intact, remote memory intact  Attention and Concentration: intact, attentive to conversation  Fund of Knowledge: intact, aware of current events, vocabulary appropriate  Insight: limited  Judgment: limited     Family History    None       Tobacco Use    Smoking status: Never Smoker    Smokeless tobacco: Never Used   Substance and Sexual Activity    Alcohol use: Never    Drug use: Never    Sexual activity: Not on file     Psychotherapeutics (From admission, onward)                Start     Stop Route Frequency Ordered    06/29/22 1345  FLUoxetine capsule 10 mg         -- Oral Daily 06/29/22 1342             Review of Systems  Objective:     Vital Signs (Most Recent):  Temp: 98.2 °F (36.8 °C) (07/01/22 0600)  Pulse: 84 (07/01/22 0600)  Resp: 20 (07/01/22 0600)  BP: 123/73 (07/01/22 0600)  SpO2: 99 % (07/01/22 0600) Vital Signs (24h Range):  Temp:  [98.1 °F (36.7 °C)-98.2 °F (36.8 °C)] 98.2 °F (36.8 °C)  Pulse:  [64-84] 84  Resp:  [18-20] 20  SpO2:  [99 %] 99 %  BP: (123-133)/(73-75) 123/73     Height: 5' 10" (177.8 cm)  Weight: 81.4 kg (179 lb 7.3 oz)  Body mass index is 25.75 kg/m².      Intake/Output Summary (Last 24 hours) at 7/1/2022 1437  Last data filed at 6/30/2022 1700  Gross per 24 hour   Intake --   Output 0 ml   Net 0 ml       Physical Exam     Significant Labs: Last 24 Hours:   Recent Lab Results       None            Significant Imaging: " I have reviewed all pertinent imaging results/findings within the past 24 hours.       Scheduled Medications:   FLUoxetine  10 mg Oral Daily    levothyroxine  125 mcg Oral Before breakfast       PRN Medications:  acetaminophen, aluminum-magnesium hydroxide-simethicone, ibuprofen, magnesium hydroxide 400 mg/5 ml    Review of patient's allergies indicates:   Allergen Reactions    Fish containing products Anaphylaxis    Shellfish containing products Anaphylaxis       Assessment/Plan:     Major depressive disorder, recurrent episode, severe  Continue meds and support.         Need for Continued Hospitalization:  Psychiatric illness continues to pose a potential threat to life or bodily function, of self or others, thereby requiring the need for continued inpatient psychiatric hospitalization., Protective inpatient psychiatric hospitalization required while a safe disposition plan is enacted. and Requires ongoing hospitalization for stabilization of medications.    Anticipated Disposition:  Home or Self Care    Total time:  15 with greater than 50% of this time spent in counseling and/or coordination of care.       Edmundo Jamison MD   Psychiatry  [facility]

## 2022-07-01 NOTE — SUBJECTIVE & OBJECTIVE
Interval History: Patient was seen via video telemedicine and consented to the interview. The patient was located in [place], [place] and the provider was located in [place], [place].    Mood improving. No si/hi. No psychosis. Sleeping well. No side effects with prozac. Coping better. Fixated on going home to paying bills. He is now attending a few groups.     Psychiatric Mental Status Exam:  General Appearance: well groomed  Arousal: alert  Behavior: cooperative  Movements and Motor Activity: no abnormal involuntary movements noted  Orientation: oriented to person, place, time, and situation  Speech: normal rate, normal rhythm, normal volume, normal tone  Mood: improved  Affect: mood-congruent, constricted  Thought Process: linear, logical  Associations: intact  Thought Content and Perceptions: no suicidal ideation, no homicidal ideation, no auditory hallucinations, no visual hallucinations, no paranoid ideation, no ideas of reference, no evidence of delusions or psychosis  Recent and Remote Memory: recent memory intact, remote memory intact  Attention and Concentration: intact, attentive to conversation  Fund of Knowledge: intact, aware of current events, vocabulary appropriate  Insight: limited  Judgment: limited     Family History    None       Tobacco Use    Smoking status: Never Smoker    Smokeless tobacco: Never Used   Substance and Sexual Activity    Alcohol use: Never    Drug use: Never    Sexual activity: Not on file     Psychotherapeutics (From admission, onward)                Start     Stop Route Frequency Ordered    06/29/22 1345  FLUoxetine capsule 10 mg         -- Oral Daily 06/29/22 1342             Review of Systems  Objective:     Vital Signs (Most Recent):  Temp: 98.2 °F (36.8 °C) (07/01/22 0600)  Pulse: 84 (07/01/22 0600)  Resp: 20 (07/01/22 0600)  BP: 123/73 (07/01/22 0600)  SpO2: 99 % (07/01/22 0600) Vital Signs (24h Range):  Temp:  [98.1 °F (36.7 °C)-98.2 °F (36.8 °C)] 98.2 °F (36.8 °C)  Pulse:   "[64-84] 84  Resp:  [18-20] 20  SpO2:  [99 %] 99 %  BP: (123-133)/(73-75) 123/73     Height: 5' 10" (177.8 cm)  Weight: 81.4 kg (179 lb 7.3 oz)  Body mass index is 25.75 kg/m².      Intake/Output Summary (Last 24 hours) at 7/1/2022 1437  Last data filed at 6/30/2022 1700  Gross per 24 hour   Intake --   Output 0 ml   Net 0 ml       Physical Exam     Significant Labs: Last 24 Hours:   Recent Lab Results       None            Significant Imaging: I have reviewed all pertinent imaging results/findings within the past 24 hours.  "

## 2022-07-02 PROCEDURE — 25000003 PHARM REV CODE 250: Performed by: PSYCHIATRY & NEUROLOGY

## 2022-07-02 PROCEDURE — 11400000 HC PSYCH PRIVATE ROOM

## 2022-07-02 RX ADMIN — FLUOXETINE 10 MG: 10 CAPSULE ORAL at 08:07

## 2022-07-02 RX ADMIN — LEVOTHYROXINE SODIUM 125 MCG: 0.1 TABLET ORAL at 05:07

## 2022-07-02 NOTE — NURSING
Erasmo denies SI and contracts for safety. Selective interaction. Preoccupied with D/C to go home to pay bills despite the fact that his significant other has the ability to pay them for him. (+) anxiety per patient. (+) sleep. Denies AVH. (+) appetite. Medication compliant. Education and encouragement provided. (+) coping skills discussed. Q15 observations maintained for safety. Will continue to monnitor closely.

## 2022-07-02 NOTE — GROUP NOTE
Nursing Group      Group Focus: Symptoms Management      Number of patients in attendance: 7    Group Start Time: 1400  Group End Time:  1445  Groups Date: 7/2/2022  Group Topic:  Behavioral Health  Group Department: [facility]  Group Facilitators:  Elham Tovar RN  _____________________________________________________________________    Patient Name: Erasmo Man  MRN: [id number]  Patient Class: IP- Psych   Admission Date\Time: 6/29/2022  2:55 AM  Hospital Length of Stay: 3  Primary Care Provider: Ignacio Shetty NP     Referred by: Behavioral Medicine Unit Treatment Team     Target symptoms: Depression     Patient's response to treatment: Active Listening     Progress toward goals: Progressing well     Interval History: Attended minimal participation fair response     Diagnosis: MDD     Plan: Continue treatment on BMU

## 2022-07-03 PROBLEM — F33.2 MAJOR DEPRESSIVE DISORDER, RECURRENT EPISODE, SEVERE: Status: RESOLVED | Noted: 2022-06-29 | Resolved: 2022-07-03

## 2022-07-03 PROCEDURE — 25000003 PHARM REV CODE 250: Performed by: PSYCHIATRY & NEUROLOGY

## 2022-07-03 PROCEDURE — 11400000 HC PSYCH PRIVATE ROOM

## 2022-07-03 RX ORDER — FLUOXETINE 10 MG/1
10 CAPSULE ORAL DAILY
Qty: 30 CAPSULE | Refills: 0 | Status: SHIPPED | OUTPATIENT
Start: 2022-07-04 | End: 2022-08-03

## 2022-07-03 RX ORDER — LEVOTHYROXINE SODIUM 125 UG/1
125 TABLET ORAL
Qty: 30 TABLET | Refills: 0 | Status: SHIPPED | OUTPATIENT
Start: 2022-07-04 | End: 2022-08-03

## 2022-07-03 RX ADMIN — FLUOXETINE 10 MG: 10 CAPSULE ORAL at 08:07

## 2022-07-03 RX ADMIN — LEVOTHYROXINE SODIUM 125 MCG: 0.1 TABLET ORAL at 06:07

## 2022-07-03 NOTE — PROGRESS NOTES
"[facility]  Psychiatry  Progress Note    Patient Name: Erasmo Man  MRN: [id number]   Code Status: Full Code  Admission Date: 6/29/2022  Hospital Length of Stay: 4 days  Expected Discharge Date: 7/5/2022  Attending Physician: Emdundo Jamison MD  Primary Care Provider: Ignacio Shetty NP    Current Legal Status: Physician's Emergency Certificate (PEC)    Patient information was obtained from patient.       Subjective:     Patient is a 26 y.o., male, presents with:    Principal Problem:<principal problem not specified>    Chief Complaint: good    HPI: "A stupid mistake saying I was going to kill myself."  26-year-old male admitted for depression and SI with plan to kills self with a knife. He argued with his girlfriend and reportedly grabbed a knife to harm self and she called the police. Admits he did this but says he was not going to go through with harming self.He is upset because of financial strain and not working. 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 He  does have a history of depression but was never treated. He tried to harm self as teenager but did not go to the hospital. He has no psychotic features and no shadi. 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      Hospital Course: He was admitted to the [facility] hospital and monitored for safety. He was provided with therapy. Meds were reconciled. He was restarted on synthroid. He was given prozac for depression.       Interval History: Patient was seen via video telemedicine and consented to the interview. The patient was located in [place], [place] and the provider was located in [place], [place].    HE is doing well. NO complaints. Still wants to go home. No psychosis. No si/hi. Calm and cooperative. Tolerating meds. Plan for discharge on  Tuesday 7/5/22.    Psychiatric Mental Status Exam:  General Appearance: well groomed  Arousal: alert  Behavior: cooperative  Movements " "and Motor Activity: no abnormal involuntary movements noted  Orientation: oriented to person, place, time, and situation  Speech: normal rate, normal rhythm, normal volume, normal tone  Mood: good  Affect: mood-congruent, constricted  Thought Process: linear, logical  Associations: intact  Thought Content and Perceptions: no suicidal ideation, no homicidal ideation, no auditory hallucinations, no visual hallucinations, no paranoid ideation, no ideas of reference, no evidence of delusions or psychosis  Recent and Remote Memory: recent memory intact, remote memory intact  Attention and Concentration: intact, attentive to conversation  Fund of Knowledge: intact, aware of current events, vocabulary appropriate  Insight: fair  Judgment: fair    Family History    None       Tobacco Use    Smoking status: Never Smoker    Smokeless tobacco: Never Used   Substance and Sexual Activity    Alcohol use: Never    Drug use: Never    Sexual activity: Not on file     Psychotherapeutics (From admission, onward)                Start     Stop Route Frequency Ordered    06/29/22 1345  FLUoxetine capsule 10 mg         -- Oral Daily 06/29/22 1342             Review of Systems  Objective:     Vital Signs (Most Recent):  Temp: 97.5 °F (36.4 °C) (07/03/22 0641)  Pulse: 79 (07/03/22 0641)  Resp: 16 (07/03/22 0641)  BP: 118/73 (07/03/22 0641)  SpO2: 100 % (07/03/22 0641) Vital Signs (24h Range):  Temp:  [97.5 °F (36.4 °C)-98.1 °F (36.7 °C)] 97.5 °F (36.4 °C)  Pulse:  [72-79] 79  Resp:  [16] 16  SpO2:  [98 %-100 %] 100 %  BP: (118-127)/(73) 118/73     Height: 5' 10" (177.8 cm)  Weight: 81.4 kg (179 lb 7.3 oz)  Body mass index is 25.75 kg/m².    No intake or output data in the 24 hours ending 07/03/22 0838    Physical Exam     Significant Labs: Last 72 Hours:   Recent Lab Results       None            Significant Imaging: I have reviewed all pertinent imaging results/findings within the past 24 hours.       Scheduled Medications:   " FLUoxetine  10 mg Oral Daily    levothyroxine  125 mcg Oral Before breakfast       PRN Medications:  acetaminophen, aluminum-magnesium hydroxide-simethicone, ibuprofen, magnesium hydroxide 400 mg/5 ml    Review of patient's allergies indicates:   Allergen Reactions    Fish containing products Anaphylaxis    Shellfish containing products Anaphylaxis       Assessment/Plan:     Major depressive disorder, recurrent episode, severe  Stable mood, not acute danger to self or others. Discharge home on 7/5/22.         Need for Continued Hospitalization:  Protective inpatient psychiatric hospitalization required while a safe disposition plan is enacted.    Anticipated Disposition:  Home or Self Care    Total time:  15 with greater than 50% of this time spent in counseling and/or coordination of care.       Edmundo Jamison MD   Psychiatry  [facility]

## 2022-07-03 NOTE — NURSING
Erasmo is guarded. Displays selective interaction. Isolates to room at times. Denies SI and contracts for safety. Remains preoccupied with D/C. (+) sleep at night. Encouragement given. (+) coping skills reinforced. Education provided. Q15 observations maintained for safety, suicide, and fall precautions. Will continue to monitor closely.

## 2022-07-03 NOTE — SUBJECTIVE & OBJECTIVE
Interval History: Patient was seen via video telemedicine and consented to the interview. The patient was located in [place], [place] and the provider was located in [place], [place].    HE is doing well. NO complaints. Still wants to go home. No psychosis. No si/hi. Calm and cooperative. Tolerating meds. Plan for discharge on  Tuesday 7/5/22.    Psychiatric Mental Status Exam:  General Appearance: well groomed  Arousal: alert  Behavior: cooperative  Movements and Motor Activity: no abnormal involuntary movements noted  Orientation: oriented to person, place, time, and situation  Speech: normal rate, normal rhythm, normal volume, normal tone  Mood: good  Affect: mood-congruent, constricted  Thought Process: linear, logical  Associations: intact  Thought Content and Perceptions: no suicidal ideation, no homicidal ideation, no auditory hallucinations, no visual hallucinations, no paranoid ideation, no ideas of reference, no evidence of delusions or psychosis  Recent and Remote Memory: recent memory intact, remote memory intact  Attention and Concentration: intact, attentive to conversation  Fund of Knowledge: intact, aware of current events, vocabulary appropriate  Insight: fair  Judgment: fair    Family History    None       Tobacco Use    Smoking status: Never Smoker    Smokeless tobacco: Never Used   Substance and Sexual Activity    Alcohol use: Never    Drug use: Never    Sexual activity: Not on file     Psychotherapeutics (From admission, onward)                Start     Stop Route Frequency Ordered    06/29/22 1345  FLUoxetine capsule 10 mg         -- Oral Daily 06/29/22 1342             Review of Systems  Objective:     Vital Signs (Most Recent):  Temp: 97.5 °F (36.4 °C) (07/03/22 0641)  Pulse: 79 (07/03/22 0641)  Resp: 16 (07/03/22 0641)  BP: 118/73 (07/03/22 0641)  SpO2: 100 % (07/03/22 0641) Vital Signs (24h Range):  Temp:  [97.5 °F (36.4 °C)-98.1 °F (36.7 °C)] 97.5 °F (36.4 °C)  Pulse:  [72-79] 79  Resp:  [16]  "16  SpO2:  [98 %-100 %] 100 %  BP: (118-127)/(73) 118/73     Height: 5' 10" (177.8 cm)  Weight: 81.4 kg (179 lb 7.3 oz)  Body mass index is 25.75 kg/m².    No intake or output data in the 24 hours ending 07/03/22 0838    Physical Exam     Significant Labs: Last 72 Hours:   Recent Lab Results       None            Significant Imaging: I have reviewed all pertinent imaging results/findings within the past 24 hours.  "

## 2022-07-03 NOTE — GROUP NOTE
Nursing Group      Group Focus:Emotions     Number of patients in attendance: 8    Group Start Time: 1430  Group End Time:  1515  Groups Date: 7/3/2022  Group Topic:  Behavioral Health  Group Department: [facility]  Group Facilitators:  Elham Tovar RN  _____________________________________________________________________    Patient Name: Erasmo Man  MRN: [id number]  Patient Class: IP- Psych   Admission Date\Time: 6/29/2022  2:55 AM  Hospital Length of Stay: 4  Primary Care Provider: Ignacio Shetty NP     Referred by: Behavioral Medicine Unit Treatment Team     Target symptoms: Depression, Anxiety and Poor Coping Skills     Patient's response to treatment: Active Listening     Progress toward goals: Progressing adequately     Interval History: Attended with minimal participation fair response     Diagnosis: MDD     Plan: Continue treatment on BMU

## 2022-07-03 NOTE — NURSING
Awake alert and oriented. Preoccupied with discharge. Denies SI or hallucinations. Sleeping and eating well. Compliant with medications no adverse effects observed. Q 15 min safety checks. Will monitor mood and behavior and offer emotional support.

## 2022-07-03 NOTE — GROUP NOTE
Nursing Group      Group Focus: Reminiscing      Number of patients in attendance: 9    Group Start Time: 1330  Group End Time:  1415  Groups Date: 7/3/2022  Group Topic:  Behavioral Health  Group Department: [facility]  Group Facilitators:  Mil Wesley LPN  _____________________________________________________________________    Patient Name: Erasmo Man  MRN: [id number]  Patient Class: IP- Psych   Admission Date\Time: 6/29/2022  2:55 AM  Hospital Length of Stay: 4  Primary Care Provider: Ignacio Shetty NP     Referred by: Behavioral Medicine Unit Treatment Team     Target symptoms: Depression     Patient's response to treatment: Active Listening and Self-disclosure     Progress toward goals: Progressing well     Interval History: Participated with good interaction     Diagnosis: MDD     Plan: Continue treatment on BMU

## 2022-07-04 PROCEDURE — 11400000 HC PSYCH PRIVATE ROOM

## 2022-07-04 PROCEDURE — 25000003 PHARM REV CODE 250: Performed by: PSYCHIATRY & NEUROLOGY

## 2022-07-04 RX ADMIN — LEVOTHYROXINE SODIUM 125 MCG: 0.1 TABLET ORAL at 05:07

## 2022-07-04 RX ADMIN — FLUOXETINE 10 MG: 10 CAPSULE ORAL at 08:07

## 2022-07-04 NOTE — NURSING
Erasmo denies SI and contracts for safety.Preoccupied with D/C.States he needs to go home and pay bills. Isolative. Selective interaction. Stays in bed majority of day. Mostly out of bed for meals and snacks. Denies depression and admits to anxiety. Unkempt. Body odor noticeable. (+) depression. Denial/minimizing.  Denies AVH. Encouragement given. (+) appetite and sleep. Q15 observations maintained for safety, suicide, and fall precautions. Will continue to monitor closely.Planning for D/C on Tuesday.

## 2022-07-04 NOTE — GROUP NOTE
Nursing Group      Group Focus: Life Skills      Number of patients in attendance: 7    Group Start Time: 0845  Group End Time:  0930  Groups Date: 7/4/2022  Group Topic:  Behavioral Health  Group Department: [facility]  Group Facilitators:  Dulce Manley RN  _____________________________________________________________________    Patient Name: Erasmo Man  MRN: [id number]  Patient Class: IP- Psych   Admission Date\Time: 6/29/2022  2:55 AM  Hospital Length of Stay: 5  Primary Care Provider: Ignacio Shetty NP     Referred by: Behavioral Medicine Unit Treatment Team     Target symptoms: Poor Coping Skills     Patient's response to treatment: Self-disclosure     Progress toward goals: Progressing well     Interval History: full participation;  Relevant and meaningful disclosure.     Diagnosis: MDD     Plan: Continue treatment on BMU

## 2022-07-05 VITALS
SYSTOLIC BLOOD PRESSURE: 116 MMHG | OXYGEN SATURATION: 100 % | TEMPERATURE: 98 F | BODY MASS INDEX: 25.62 KG/M2 | RESPIRATION RATE: 16 BRPM | DIASTOLIC BLOOD PRESSURE: 64 MMHG | HEART RATE: 77 BPM | WEIGHT: 179 LBS | HEIGHT: 70 IN

## 2022-07-05 PROCEDURE — 25000003 PHARM REV CODE 250: Performed by: PSYCHIATRY & NEUROLOGY

## 2022-07-05 RX ADMIN — LEVOTHYROXINE SODIUM 125 MCG: 0.1 TABLET ORAL at 05:07

## 2022-07-05 RX ADMIN — FLUOXETINE 10 MG: 10 CAPSULE ORAL at 08:07

## 2022-07-05 NOTE — NURSING
Patient is awake and alert on the unit.  Patient has blunted affect and improved mood.  Patient states he feels great.  Patient denies SI and HI.  Minimal interaction with others.  No acute distress noted.  Will continue to monitor closely and provide emotional support.

## 2022-07-05 NOTE — PROGRESS NOTES
Pt will be discharged to home and followup with Ignacio Shetty NP.  Pt was cooperative with treatment.  Pt denies HI/SI on discharge.  All referrals and d/c orders have been faxed to provider.  Family will provide transport home.

## 2022-07-05 NOTE — NURSING
Pt discharged home transported by GF.  Belongings and discharge instructions given to Pt.  Pt denies SI, HI and AVH.

## 2022-07-08 NOTE — DISCHARGE SUMMARY
"[facility]  Psychiatry  Discharge Summary      Patient Name: Erasmo Man  MRN: [id number]  Admission Date: 6/29/2022  Hospital Length of Stay: 6 days  Discharge Date and Time: 7/5/2022  8:50 AM  Attending Physician: No att. providers found   Discharging Provider: Edmundo Jamison MD  Primary Care Provider: Ignacio Shetty NP    HPI:   "A stupid mistake saying I was going to kill myself."  26-year-old male admitted for depression and SI with plan to kills self with a knife. He argued with his girlfriend and reportedly grabbed a knife to harm self and she called the police. Admits he did this but says he was not going to go through with harming self.He is upset because of financial strain and not working. 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 He  does have a history of depression but was never treated. He tried to harm self as teenager but did not go to the hospital. He has no psychotic features and no shadi. 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      Hospital Course:   He was admitted to the [facility] hospital and monitored for safety. He was provided with therapy. Meds were reconciled. He was restarted on synthroid. He was given prozac for depression. Mood improved. HE denied si/hi. He learned coping skills. He was worried about being home to pay bills. He has no aggression and no psychosis. He was stable for discharge.    Psychiatric Mental Status Exam:  General Appearance: well groomed  Arousal: alert  Behavior: cooperative  Movements and Motor Activity: no abnormal involuntary movements noted  Orientation: oriented to person, place, time, and situation  Speech: normal rate, normal rhythm, normal volume, normal tone  Mood: good  Affect: mood-congruent, constricted  Thought Process: linear, logical  Associations: intact  Thought Content and Perceptions: no suicidal ideation, no homicidal ideation, no auditory " hallucinations, no visual hallucinations, no paranoid ideation, no ideas of reference, no evidence of delusions or psychosis  Recent and Remote Memory: recent memory intact, remote memory intact  Attention and Concentration: intact, attentive to conversation  Fund of Knowledge: intact, aware of current events, vocabulary appropriate  Insight: fair  Judgment: fair       Goals of Care Treatment Preferences:  Code Status: Full Code      * No surgery found *     Consults:   Consults (From admission, onward)        Status Ordering Provider     Inpatient consult to Hospital Medicine  Once        Provider:  Brodie Villa MD    Completed DRE HEADLEY        Physical Exam     Significant Labs:   Last 24 Hours:   Recent Lab Results     None          Significant Imaging: I have reviewed all pertinent imaging results/findings within the past 24 hours.    Smoking Cessation:   Does the patient smoke? No  Does the patient want a prescription for Smoking Cessation? no  Does the patient want counseling for Smoking Cessation? No         Pending Diagnostic Studies:     None        Final Active Diagnoses:    Diagnosis Date Noted POA    Hypothyroid [E03.9] 06/29/2022 Yes      Problems Resolved During this Admission:    Diagnosis Date Noted Date Resolved POA    Major depressive disorder, recurrent episode, severe [F33.2] 06/29/2022 07/03/2022 Yes      No new Assessment & Plan notes have been filed under this hospital service since the last note was generated.  Service: Psychiatry      Functional Condition: Independent ambulation    Discharged Condition: fair    Disposition: Home or Self Care    Follow Up:   Follow-up Information     Ignacio Shetty NP Follow up.    Specialty: Family Medicine  Why: Referral sent.  pt will call for appt date and time  Contact information:  [facility]  [address]  [facility] [place] [id number]  [phone]                       Patient Instructions:   No discharge procedures on  file.  Medications:  Reconciled Home Medications:      Medication List      START taking these medications    FLUoxetine 10 MG capsule  Take 1 capsule (10 mg total) by mouth once daily.        CONTINUE taking these medications    levothyroxine 125 MCG tablet  Commonly known as: SYNTHROID  Take 1 tablet (125 mcg total) by mouth before breakfast.          Is patient being discharged on multiple antipsychotics? No        Total time:15 with greater than 50% of this time spent in counseling and/or coordination of care.     All elements of the transition record were discussed with the patient at discharge and patient agrees to this plan.    Edmundo Jamison MD  Psychiatry  [facility]

## 2023-11-20 VITALS
WEIGHT: 179.88 LBS | DIASTOLIC BLOOD PRESSURE: 72 MMHG | TEMPERATURE: 98 F | OXYGEN SATURATION: 98 % | SYSTOLIC BLOOD PRESSURE: 110 MMHG | HEIGHT: 67 IN | BODY MASS INDEX: 28.23 KG/M2 | HEART RATE: 67 BPM

## 2023-11-20 PROCEDURE — 83036 HEMOGLOBIN GLYCOSYLATED A1C: CPT | Performed by: NURSE PRACTITIONER

## 2023-11-20 PROCEDURE — 84443 ASSAY THYROID STIM HORMONE: CPT | Performed by: NURSE PRACTITIONER

## 2023-11-20 PROCEDURE — 80053 COMPREHEN METABOLIC PANEL: CPT | Performed by: NURSE PRACTITIONER

## 2023-11-20 PROCEDURE — 84439 ASSAY OF FREE THYROXINE: CPT | Performed by: NURSE PRACTITIONER

## 2023-11-20 PROCEDURE — 80061 LIPID PANEL: CPT | Performed by: NURSE PRACTITIONER

## 2023-11-20 PROCEDURE — 85025 COMPLETE CBC W/AUTO DIFF WBC: CPT | Performed by: NURSE PRACTITIONER

## 2023-11-20 RX ORDER — ESCITALOPRAM OXALATE 5 MG/5ML
10 SOLUTION ORAL DAILY
COMMUNITY
Start: 2023-01-10